# Patient Record
Sex: MALE | URBAN - METROPOLITAN AREA
[De-identification: names, ages, dates, MRNs, and addresses within clinical notes are randomized per-mention and may not be internally consistent; named-entity substitution may affect disease eponyms.]

---

## 2020-07-30 ENCOUNTER — HOSPITAL ENCOUNTER (OUTPATIENT)
Dept: EMERGENCY MEDICINE | Facility: HOSPITAL | Age: 35
End: 2020-07-31
Attending: INTERNAL MEDICINE | Admitting: INTERNAL MEDICINE

## 2020-07-30 LAB
ABS NEUT (OLG): 14.29 X10(3)/MCL (ref 2.1–9.2)
ALBUMIN SERPL-MCNC: 4.6 GM/DL (ref 3.4–5)
ALBUMIN/GLOB SERPL: 1.2 RATIO (ref 1.1–2)
ALP SERPL-CCNC: 82 UNIT/L (ref 45–117)
ALT SERPL-CCNC: 43 UNIT/L (ref 12–78)
AMPHET UR QL SCN: NEGATIVE
ANISOCYTOSIS BLD QL SMEAR: ABNORMAL
APPEARANCE, UA: CLEAR
AST SERPL-CCNC: 30 UNIT/L (ref 15–37)
BACTERIA #/AREA URNS AUTO: ABNORMAL /HPF
BARBITURATE SCN PRESENT UR: NEGATIVE
BASOPHILS NFR BLD MANUAL: 0 %
BENZODIAZ UR QL SCN: NEGATIVE
BILIRUB SERPL-MCNC: 0.7 MG/DL (ref 0.2–1)
BILIRUB UR QL STRIP: NEGATIVE
BILIRUBIN DIRECT+TOT PNL SERPL-MCNC: 0.2 MG/DL (ref 0–0.2)
BILIRUBIN DIRECT+TOT PNL SERPL-MCNC: 0.5 MG/DL
BUN SERPL-MCNC: 20 MG/DL (ref 7–18)
CALCIUM SERPL-MCNC: 8.5 MG/DL (ref 8.5–10.1)
CANNABINOIDS UR QL SCN: NEGATIVE
CHLORIDE SERPL-SCNC: 101 MMOL/L (ref 98–107)
CK SERPL-CCNC: 407 UNIT/L (ref 39–308)
CO2 SERPL-SCNC: 25 MMOL/L (ref 21–32)
COCAINE UR QL SCN: NEGATIVE
COLOR UR: YELLOW
CREAT SERPL-MCNC: 3.8 MG/DL (ref 0.6–1.3)
CREAT UR-MCNC: 150 MG/DL
CREAT UR-MCNC: 154 MG/DL
EOSINOPHIL NFR BLD MANUAL: 1 %
ERYTHROCYTE [DISTWIDTH] IN BLOOD BY AUTOMATED COUNT: 13.2 % (ref 11.5–14.5)
FESODIUM % (OHS): 1 %
GLOBULIN SER-MCNC: 3.9 GM/ML (ref 2.3–3.5)
GLUCOSE (UA): 500 MG/DL
GLUCOSE SERPL-MCNC: 109 MG/DL (ref 74–106)
GRANULOCYTES NFR BLD MANUAL: 75 % (ref 43–75)
HCT VFR BLD AUTO: 47.3 % (ref 40–51)
HGB BLD-MCNC: 16 GM/DL (ref 13.5–17.5)
HGB UR QL STRIP: 0.2
HYALINE CASTS #/AREA URNS LPF: ABNORMAL /LPF
KETONES UR QL STRIP: ABNORMAL
LEUKOCYTE ESTERASE UR QL STRIP: NEGATIVE
LYMPHOCYTES NFR BLD MANUAL: 17 % (ref 20.5–51.1)
MAGNESIUM SERPL-MCNC: 2.7 MG/DL (ref 1.6–2.6)
MCH RBC QN AUTO: 30.9 PG (ref 26–34)
MCHC RBC AUTO-ENTMCNC: 33.8 GM/DL (ref 31–37)
MCV RBC AUTO: 91.5 FL (ref 80–100)
MONOCYTES NFR BLD MANUAL: 4 % (ref 2–9)
MUCOUS THREADS URNS QL MICRO: ABNORMAL
NEUTS BAND NFR BLD MANUAL: 3 % (ref 0–10)
NITRITE UR QL STRIP: NEGATIVE
OPIATES UR QL SCN: NEGATIVE
OSMOLALITY UR: 516 MOSM/KG (ref 300–1300)
PCP UR QL: NEGATIVE
PH UR STRIP.AUTO: 6.5 [PH] (ref 5–8)
PH UR STRIP: 6.5 [PH] (ref 4.5–8)
PLATELET # BLD AUTO: 300 X10(3)/MCL (ref 130–400)
PLATELET # BLD EST: ADEQUATE 10*3/UL
PMV BLD AUTO: 9.5 FL (ref 7.4–10.4)
POIKILOCYTOSIS BLD QL SMEAR: ABNORMAL
POTASSIUM SERPL-SCNC: 6.4 MMOL/L (ref 3.5–5.1)
PROT SERPL-MCNC: 8.5 GM/DL (ref 6.4–8.2)
PROT UR QL STRIP: 70 MG/DL
RBC # BLD AUTO: 5.17 X10(6)/MCL (ref 4.5–5.9)
RBC #/AREA URNS AUTO: ABNORMAL /HPF
RBC MORPH BLD: ABNORMAL
SARS-COV-2 AG RESP QL IA.RAPID: NEGATIVE
SODIUM SERPL-SCNC: 135 MMOL/L (ref 136–145)
SODIUM UR-SCNC: 69 MMOL/L
SODIUM UR-SCNC: 69 MMOL/L
SP GR UR STRIP: 1.01 (ref 1–1.03)
SQUAMOUS #/AREA URNS LPF: ABNORMAL /LPF
TEMPERATURE, URINE (OHS): 24.8 DEGC (ref 20–25)
UROBILINOGEN UR STRIP-ACNC: NORMAL
WBC # SPEC AUTO: 18.3 X10(3)/MCL (ref 4.5–11)
WBC #/AREA URNS AUTO: ABNORMAL /HPF

## 2020-07-31 LAB
ABS NEUT (OLG): 6.98 X10(3)/MCL (ref 2.1–9.2)
ALBUMIN SERPL-MCNC: 3.3 GM/DL (ref 3.4–5)
ALBUMIN SERPL-MCNC: 3.5 GM/DL (ref 3.4–5)
ALBUMIN/GLOB SERPL: 1 RATIO (ref 1.1–2)
ALBUMIN/GLOB SERPL: 1.1 RATIO (ref 1.1–2)
ALP SERPL-CCNC: 63 UNIT/L (ref 45–117)
ALP SERPL-CCNC: 77 UNIT/L (ref 45–117)
ALT SERPL-CCNC: 31 UNIT/L (ref 12–78)
ALT SERPL-CCNC: 37 UNIT/L (ref 12–78)
AST SERPL-CCNC: 30 UNIT/L (ref 15–37)
AST SERPL-CCNC: 38 UNIT/L (ref 15–37)
BASOPHILS # BLD AUTO: 0 X10(3)/MCL (ref 0–0.2)
BASOPHILS NFR BLD AUTO: 0 %
BILIRUB SERPL-MCNC: 0.5 MG/DL (ref 0.2–1)
BILIRUB SERPL-MCNC: 0.8 MG/DL (ref 0.2–1)
BILIRUBIN DIRECT+TOT PNL SERPL-MCNC: 0.1 MG/DL (ref 0–0.2)
BILIRUBIN DIRECT+TOT PNL SERPL-MCNC: 0.2 MG/DL (ref 0–0.2)
BILIRUBIN DIRECT+TOT PNL SERPL-MCNC: 0.4 MG/DL
BILIRUBIN DIRECT+TOT PNL SERPL-MCNC: 0.6 MG/DL
BUN SERPL-MCNC: 16 MG/DL (ref 7–18)
BUN SERPL-MCNC: 18 MG/DL (ref 7–18)
BUN SERPL-MCNC: 27 MG/DL (ref 7–18)
CALCIUM SERPL-MCNC: 8.5 MG/DL (ref 8.5–10.1)
CALCIUM SERPL-MCNC: 8.5 MG/DL (ref 8.5–10.1)
CALCIUM SERPL-MCNC: 9.4 MG/DL (ref 8.5–10.1)
CHLORIDE SERPL-SCNC: 101 MMOL/L (ref 98–107)
CHLORIDE SERPL-SCNC: 106 MMOL/L (ref 98–107)
CHLORIDE SERPL-SCNC: 109 MMOL/L (ref 98–107)
CO2 SERPL-SCNC: 24 MMOL/L (ref 21–32)
CO2 SERPL-SCNC: 25 MMOL/L (ref 21–32)
CO2 SERPL-SCNC: 29 MMOL/L (ref 21–32)
CREAT SERPL-MCNC: 1.3 MG/DL (ref 0.6–1.3)
CREAT SERPL-MCNC: 1.5 MG/DL (ref 0.6–1.3)
CREAT SERPL-MCNC: 3.2 MG/DL (ref 0.6–1.3)
CREAT/UREA NIT SERPL: 8 MG/DL (ref 12–14)
EOSINOPHIL # BLD AUTO: 0.1 X10(3)/MCL (ref 0–0.9)
EOSINOPHIL NFR BLD AUTO: 1 %
ERYTHROCYTE [DISTWIDTH] IN BLOOD BY AUTOMATED COUNT: 13.3 % (ref 11.5–14.5)
EST. AVERAGE GLUCOSE BLD GHB EST-MCNC: 123 MG/DL
GLOBULIN SER-MCNC: 3.1 GM/ML (ref 2.3–3.5)
GLOBULIN SER-MCNC: 3.5 GM/ML (ref 2.3–3.5)
GLUCOSE SERPL-MCNC: 123 MG/DL (ref 74–106)
GLUCOSE SERPL-MCNC: 98 MG/DL (ref 74–106)
GLUCOSE SERPL-MCNC: 99 MG/DL (ref 74–106)
HBA1C MFR BLD: 5.9 % (ref 4.2–6.3)
HCT VFR BLD AUTO: 40.1 % (ref 40–51)
HGB BLD-MCNC: 13.6 GM/DL (ref 13.5–17.5)
IMM GRANULOCYTES # BLD AUTO: 0.12 10*3/UL
IMM GRANULOCYTES NFR BLD AUTO: 1 %
LYMPHOCYTES # BLD AUTO: 3.2 X10(3)/MCL (ref 0.6–4.6)
LYMPHOCYTES NFR BLD AUTO: 27 %
MCH RBC QN AUTO: 31.3 PG (ref 26–34)
MCHC RBC AUTO-ENTMCNC: 33.9 GM/DL (ref 31–37)
MCV RBC AUTO: 92.2 FL (ref 80–100)
MONOCYTES # BLD AUTO: 1.4 X10(3)/MCL (ref 0.1–1.3)
MONOCYTES NFR BLD AUTO: 12 %
NEUTROPHILS # BLD AUTO: 6.98 X10(3)/MCL (ref 2.1–9.2)
NEUTROPHILS NFR BLD AUTO: 59 %
PLATELET # BLD AUTO: 255 X10(3)/MCL (ref 130–400)
PMV BLD AUTO: 9.5 FL (ref 7.4–10.4)
POTASSIUM SERPL-SCNC: 3.9 MMOL/L (ref 3.5–5.1)
POTASSIUM SERPL-SCNC: 4.1 MMOL/L (ref 3.5–5.1)
POTASSIUM SERPL-SCNC: 4.1 MMOL/L (ref 3.5–5.1)
PROT SERPL-MCNC: 6.4 GM/DL (ref 6.4–8.2)
PROT SERPL-MCNC: 7 GM/DL (ref 6.4–8.2)
RBC # BLD AUTO: 4.35 X10(6)/MCL (ref 4.5–5.9)
SODIUM SERPL-SCNC: 136 MMOL/L (ref 136–145)
SODIUM SERPL-SCNC: 140 MMOL/L (ref 136–145)
SODIUM SERPL-SCNC: 140 MMOL/L (ref 136–145)
WBC # SPEC AUTO: 11.9 X10(3)/MCL (ref 4.5–11)

## 2022-05-05 NOTE — HISTORICAL [facility] CERNER
This is a historical note converted from [employer]. Formatting and pictures may have been removed.  Please reference [employer] for original formatting and attached multimedia. Admit and Discharge Dates  Admit Date: 07/31/2020  Discharge Date: 07/31/2020  Physicians  Attending Physician - Sinan CALLOWAY, Brianne  Admitting Physician - Sinan CALLOWAY, Brianne  Primary Care Physician - No PCP, No  Discharge Diagnosis  1. SHARON 2/2 to Rhabdomyolysis  2. Hyperkalemia  Surgical Procedures  No procedures recorded for this visit.  Immunizations  No immunizations recorded for this visit.  Admission Information  A 34-year-old male with no significant past medical history was?brought to ED by spouse for muscle cramps x2.5 hours. ?Patient states he was working outside in the heat all day as a , which is his normal job. ?Suddenly, around 5 PM on 7/30/2020, he felt significant muscle cramping in bilateral lower extremities followed by numbness in the ribs and upper extremities. ?He vomited 2 times starting then. ?Reported feeling like his heart was giving out . ?Stated he had generalized weakness and was unable to move at this point. ?He was brought by his wife to [facility] ED for evaluation. ?Patient said he drank 4 bottles of water, approximately 16 ounces, throughout the day. ?He normally drinks 8 bottles of water during the day at work. ?Usually urinates 5 times per day, but reports decreased urination today. ?Admits to feeling hot and sweaty throughout the day today. ?Patient denied fever, chills, headache, lightheadedness, dizziness, shortness of breath, chest pain, palpitations, constipation, diarrhea, dysuria, change in urine color, trauma, or falls. ?Patient does admit to 1 prior, similar, transient episode of muscle cramps that self resolved without medical attention.  Hospital Course  On 7/31, the patient was admitted to Internal Medicine with a diagnosis of SHARON 2/2 to Rhabdomyolysis and Hyperkalemia. Labs showed a?Cr of 3.8, ?K  of 6.4 and?CK of 480. He was given three 1 L bolus of NS in the ED and then started on a 3L NS infusion. He was also given calcium carbonate and Insulin. On repeat CMP, his K normalized at 3.9 but his Cr was still 1.5. Fluids were continued and repeat CMP at 3 PM showed a Cr of 1.3. On reassessment, the patient was asymptomatic and ready to go home.  Significant Findings  Accession:?[id number]  Order:?XR Chest 2 Views  Report Dt/Tm:?07/31/2020 09:44  Report:?  ?  CLINICAL: ?Pneumonia.  ?  COMPARISON: None available.  ?  FINDINGS: ?Cardiopericardial silhouette is within normal limits.?  Lungs are without dense focal or segmental consolidation, congestion,  pleural effusion or pneumothorax. ?  ?  IMPRESSION:  ?  No acute cardiopulmonary process identified.  ?  ?  ?  Time Spent on discharge  Greater than 15 minutes  Objective  Vitals & Measurements  T:?36.7? ?C (Oral)? TMIN:?36.7? ?C (Oral)? TMAX:?37? ?C (Oral)? HR:?76(Monitored)? RR:?21? BP:?122/68? SpO2:?99%? WT:?57?kg? BMI:?20.44?  Physical Exam  Constitutional: ?As per HPI.??  Eye: ?No recent visual problem, No visual disturbances.??  Ear/Nose/Mouth/Throat: ?No nasal congestion, No sore throat.???  Respiratory: ?No shortness of breath, No cough, No sputum production.??  Cardiovascular: ?As per HPI.??  Gastrointestinal: ?No nausea, No vomiting, No diarrhea, No constipation.??  Genitourinary: ?As per HPI.??  Hematology/Lymphatics: ?No bruising tendency, No bleeding tendency.??  Immunologic: ?No recurrent infections.??  Musculoskeletal: ?As per HPI.??  Integumentary: ?No rash, No breakdown.??  Neurologic: ?No confusion, No numbness, No tingling, No headache.??  Psychiatric: ?No anxiety, No depression.  Patient Discharge Condition  The patient is clinically and hemodynamically stable for discharge.  Discharge Disposition  1. The patient is stable to be discharged home.  2. The patient will be discharged to continue all home medications. He will not be started on any  new medications from .  3. The patient is to follow up with [facility], in [facility] on August 31.  4. The patient was given return precautions for new or worsening symptoms.   Discharge Medication Reconciliation  Discontinue  ibuprofen (ibuprofen 600 mg oral tablet)?600 mg, Oral, q8hr, PRN as needed for pain  Education and Orders Provided  Acute Kidney Injury, Adult(Macedonian)  Hyperkalemia(Macedonian)  Discharge - 07/31/20 15:21:00 CDT, Home?  Follow up  [facility]  ????  Please follow up with [facility], in [facility] on August 31.  Report to Emergency Department if symptoms return or worsen      I was present with the Resident during discharge day management.  ?  [ x?] I discussed the case with the Resident and agree with the discharge plan as above.  [ ?] I discussed the case with the Resident and agree with the discharge plan as above?except:  ?  Time spent on discharge [ >30?] minutes  ?